# Patient Record
Sex: MALE | Race: OTHER | HISPANIC OR LATINO
[De-identification: names, ages, dates, MRNs, and addresses within clinical notes are randomized per-mention and may not be internally consistent; named-entity substitution may affect disease eponyms.]

---

## 2019-03-15 ENCOUNTER — APPOINTMENT (OUTPATIENT)
Dept: CARDIOLOGY | Facility: CLINIC | Age: 79
End: 2019-03-15
Payer: MEDICARE

## 2019-03-15 VITALS
BODY MASS INDEX: 30.58 KG/M2 | SYSTOLIC BLOOD PRESSURE: 150 MMHG | WEIGHT: 162 LBS | HEART RATE: 60 BPM | DIASTOLIC BLOOD PRESSURE: 60 MMHG | HEIGHT: 61 IN

## 2019-03-15 DIAGNOSIS — Z86.79 PERSONAL HISTORY OF OTHER DISEASES OF THE CIRCULATORY SYSTEM: ICD-10-CM

## 2019-03-15 DIAGNOSIS — R73.03 PREDIABETES.: ICD-10-CM

## 2019-03-15 PROCEDURE — 99204 OFFICE O/P NEW MOD 45 MIN: CPT

## 2019-03-15 NOTE — PHYSICAL EXAM
[General Appearance - Well Developed] : well developed [Normal Appearance] : normal appearance [Well Groomed] : well groomed [General Appearance - Well Nourished] : well nourished [No Deformities] : no deformities [General Appearance - In No Acute Distress] : no acute distress [Normal Conjunctiva] : the conjunctiva exhibited no abnormalities [Eyelids - No Xanthelasma] : the eyelids demonstrated no xanthelasmas [Normal Oral Mucosa] : normal oral mucosa [No Oral Pallor] : no oral pallor [No Oral Cyanosis] : no oral cyanosis [Normal Jugular Venous A Waves Present] : normal jugular venous A waves present [Normal Jugular Venous V Waves Present] : normal jugular venous V waves present [No Jugular Venous Hurley A Waves] : no jugular venous hurley A waves [Heart Rate And Rhythm] : heart rate and rhythm were normal [Heart Sounds] : normal S1 and S2 [Murmurs] : no murmurs present [Systolic grade ___/6] : A grade [unfilled]/6 systolic murmur was heard. [Respiration, Rhythm And Depth] : normal respiratory rhythm and effort [Exaggerated Use Of Accessory Muscles For Inspiration] : no accessory muscle use [Auscultation Breath Sounds / Voice Sounds] : lungs were clear to auscultation bilaterally [Abdomen Soft] : soft [Abdomen Tenderness] : non-tender [Abdomen Mass (___ Cm)] : no abdominal mass palpated [Abnormal Walk] : normal gait [Gait - Sufficient For Exercise Testing] : the gait was sufficient for exercise testing [Nail Clubbing] : no clubbing of the fingernails [Cyanosis, Localized] : no localized cyanosis [Petechial Hemorrhages (___cm)] : no petechial hemorrhages [Skin Color & Pigmentation] : normal skin color and pigmentation [] : no rash [No Venous Stasis] : no venous stasis [Skin Lesions] : no skin lesions [No Skin Ulcers] : no skin ulcer [No Xanthoma] : no  xanthoma was observed [Oriented To Time, Place, And Person] : oriented to person, place, and time [Affect] : the affect was normal [Mood] : the mood was normal [No Anxiety] : not feeling anxious

## 2019-04-01 ENCOUNTER — RESULT REVIEW (OUTPATIENT)
Age: 79
End: 2019-04-01

## 2019-04-23 ENCOUNTER — APPOINTMENT (OUTPATIENT)
Dept: CARDIOLOGY | Facility: CLINIC | Age: 79
End: 2019-04-23
Payer: MEDICARE

## 2019-04-23 VITALS
BODY MASS INDEX: 30.58 KG/M2 | HEART RATE: 73 BPM | OXYGEN SATURATION: 98 % | WEIGHT: 162 LBS | SYSTOLIC BLOOD PRESSURE: 150 MMHG | HEIGHT: 61 IN | DIASTOLIC BLOOD PRESSURE: 80 MMHG

## 2019-04-23 PROCEDURE — 99214 OFFICE O/P EST MOD 30 MIN: CPT

## 2019-04-23 NOTE — PHYSICAL EXAM
[General Appearance - Well Developed] : well developed [Well Groomed] : well groomed [Normal Appearance] : normal appearance [No Deformities] : no deformities [General Appearance - In No Acute Distress] : no acute distress [General Appearance - Well Nourished] : well nourished [Eyelids - No Xanthelasma] : the eyelids demonstrated no xanthelasmas [Normal Conjunctiva] : the conjunctiva exhibited no abnormalities [Normal Oral Mucosa] : normal oral mucosa [No Oral Pallor] : no oral pallor [No Oral Cyanosis] : no oral cyanosis [Normal Jugular Venous A Waves Present] : normal jugular venous A waves present [No Jugular Venous Hurley A Waves] : no jugular venous hurley A waves [Normal Jugular Venous V Waves Present] : normal jugular venous V waves present [Respiration, Rhythm And Depth] : normal respiratory rhythm and effort [Exaggerated Use Of Accessory Muscles For Inspiration] : no accessory muscle use [Auscultation Breath Sounds / Voice Sounds] : lungs were clear to auscultation bilaterally [Murmurs] : no murmurs present [Heart Rate And Rhythm] : heart rate and rhythm were normal [Heart Sounds] : normal S1 and S2 [Systolic grade ___/6] : A grade [unfilled]/6 systolic murmur was heard. [Abdomen Soft] : soft [Abdomen Tenderness] : non-tender [Gait - Sufficient For Exercise Testing] : the gait was sufficient for exercise testing [Abnormal Walk] : normal gait [Abdomen Mass (___ Cm)] : no abdominal mass palpated [Nail Clubbing] : no clubbing of the fingernails [Cyanosis, Localized] : no localized cyanosis [Petechial Hemorrhages (___cm)] : no petechial hemorrhages [Skin Color & Pigmentation] : normal skin color and pigmentation [No Venous Stasis] : no venous stasis [Skin Lesions] : no skin lesions [] : no rash [No Xanthoma] : no  xanthoma was observed [No Skin Ulcers] : no skin ulcer [Oriented To Time, Place, And Person] : oriented to person, place, and time [Affect] : the affect was normal [No Anxiety] : not feeling anxious [Mood] : the mood was normal

## 2019-04-23 NOTE — HISTORY OF PRESENT ILLNESS
[FreeTextEntry1] : Referred by PCP: Dr. Smith\par Renal: Dr. Pedro Clark\par \par 78-year-old gentleman with hyperlipidemia, prediabetes, ex-smoker, history of heart murmur who presents with episodes of presyncope, palpitations. His other symptoms include left-sided pressure radiating to his head. He is also fatigued which is not his normal state of health. He also complains of sweating on exertion. 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.\par Bilateral carotid ultrasound January 2016 reveals heterogeneous, smooth atherosclerotic plaque noted in the right internal carotid\par 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 Patient has a brother who had a stroke and a sister who had heart surgery.\par \par pt is here with daughter to discuss further options. Patient states that he no longer has chest pain daughter states that she has her father back and does not have the same symptoms that he originally presented with. Patient has a positive stress test however her daughter would like to defer further invasive management at this time\par

## 2019-04-23 NOTE — ASSESSMENT
[FreeTextEntry1] : EKG \par 2016Normal sinus rhythm\par March 15, 2019 sinus rhythm\par \par Patient with significant cardiac risk factors with chest pain and shortness of breath and diaphoresis on exertion\par Will recommend carotid ultrasound\par \par Nuclear stress test April 1, 2019 reversible inferolateral defect.\par ABIs March 28, 2019 normal\par Carotid ultrasound March 28, 2019 trace bilateral carotid disease less than 60% bilaterally\par March 28, 2019 normal EF 68%, grade 1/4 diastolic dysfunction, compared to study in January 2016 pulmonary pressures have improved\par \par Discussed risks and benefits of cardiac catheterization.\par Risks include but not limited to bleeding, infection, vascular compromise, stroke, heart attack, kidney failure, death.\par Benefits include revascularization and resolution of symptoms.\par Patient is in NOT in agreement with procedure. Will set up at [facility].\par \par Patient's daughter does not want patient to have any further cardiac tests at this time or invasive measures she will contact us regarding cardiac angiogram\par \par Patient also with tinnitus will change aspirin to Plavix\par Patient followup in 6 weeks

## 2019-06-04 ENCOUNTER — NON-APPOINTMENT (OUTPATIENT)
Age: 79
End: 2019-06-04

## 2019-06-04 ENCOUNTER — APPOINTMENT (OUTPATIENT)
Dept: CARDIOLOGY | Facility: CLINIC | Age: 79
End: 2019-06-04
Payer: MEDICARE

## 2019-06-04 VITALS
BODY MASS INDEX: 30.78 KG/M2 | HEIGHT: 61 IN | HEART RATE: 65 BPM | DIASTOLIC BLOOD PRESSURE: 60 MMHG | WEIGHT: 163 LBS | SYSTOLIC BLOOD PRESSURE: 150 MMHG

## 2019-06-04 PROCEDURE — 99214 OFFICE O/P EST MOD 30 MIN: CPT

## 2019-06-04 NOTE — PHYSICAL EXAM
[General Appearance - Well Developed] : well developed [Normal Appearance] : normal appearance [Well Groomed] : well groomed [No Deformities] : no deformities [General Appearance - Well Nourished] : well nourished [General Appearance - In No Acute Distress] : no acute distress [Normal Conjunctiva] : the conjunctiva exhibited no abnormalities [Normal Oral Mucosa] : normal oral mucosa [No Oral Pallor] : no oral pallor [Eyelids - No Xanthelasma] : the eyelids demonstrated no xanthelasmas [Normal Jugular Venous A Waves Present] : normal jugular venous A waves present [Normal Jugular Venous V Waves Present] : normal jugular venous V waves present [No Oral Cyanosis] : no oral cyanosis [No Jugular Venous Hurley A Waves] : no jugular venous hurley A waves [Respiration, Rhythm And Depth] : normal respiratory rhythm and effort [Exaggerated Use Of Accessory Muscles For Inspiration] : no accessory muscle use [Auscultation Breath Sounds / Voice Sounds] : lungs were clear to auscultation bilaterally [Heart Sounds] : normal S1 and S2 [Heart Rate And Rhythm] : heart rate and rhythm were normal [Murmurs] : no murmurs present [Systolic grade ___/6] : A grade [unfilled]/6 systolic murmur was heard. [Abdomen Soft] : soft [Abdomen Tenderness] : non-tender [Gait - Sufficient For Exercise Testing] : the gait was sufficient for exercise testing [Abdomen Mass (___ Cm)] : no abdominal mass palpated [Abnormal Walk] : normal gait [Nail Clubbing] : no clubbing of the fingernails [Cyanosis, Localized] : no localized cyanosis [Petechial Hemorrhages (___cm)] : no petechial hemorrhages [] : no rash [Skin Color & Pigmentation] : normal skin color and pigmentation [Skin Lesions] : no skin lesions [No Venous Stasis] : no venous stasis [No Skin Ulcers] : no skin ulcer [No Xanthoma] : no  xanthoma was observed [Oriented To Time, Place, And Person] : oriented to person, place, and time [No Anxiety] : not feeling anxious [Mood] : the mood was normal [Affect] : the affect was normal

## 2019-06-13 NOTE — HISTORY OF PRESENT ILLNESS
[FreeTextEntry1] : Referred by PCP: Dr. Smith\par Renal: Dr. Pedro Clark\par \par 78-year-old gentleman with hyperlipidemia, prediabetes, ex-smoker, history of heart murmur who presents with episodes of presyncope, palpitations. His other symptoms include left-sided pressure radiating to his head. He is also fatigued which is not his normal state of health. He also complains of sweating on exertion. 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.\par Bilateral carotid ultrasound January 2016 reveals heterogeneous, smooth atherosclerotic plaque noted in the right internal carotid\par 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 Patient has a brother who had a stroke and a sister who had heart surgery.\par \par Since last visit - pt has intermittent episodes of chest discomfort.  he may be travelling. \par

## 2019-06-13 NOTE — ASSESSMENT
[FreeTextEntry1] : EKG \par 2016 Normal sinus rhythm\par March 15, 2019 sinus rhythm\par \par Patient with significant cardiac risk factors with chest pain and shortness of breath and diaphoresis on exertion\par Will recommend carotid ultrasound\par \par Nuclear stress test April 1, 2019 reversible inferolateral defect.\par ABIs March 28, 2019 normal\par Carotid ultrasound March 28, 2019 trace bilateral carotid disease less than 60% bilaterally\par March 28, 2019 normal EF 68%, grade 1/4 diastolic dysfunction, compared to study in January 2016 pulmonary pressures have improved\par \par Discussed risks and benefits of cardiac catheterization.\par Risks include but not limited to bleeding, infection, vascular compromise, stroke, heart attack, kidney failure, death.\par Benefits include revascularization and resolution of symptoms.\par Patient is in NOT in agreement with procedure. Will set up at [facility].\par \par Patient's daughter does not want patient to have any further cardiac tests at this time or invasive measures she will contact us regarding cardiac angiogram\par \par Patient also with tinnitus will change aspirin to Plavix\par Patient followup in 6 weeks\par \par f/u 4 weeks for bp - renew plavix and rx nitro prn.

## 2019-06-27 ENCOUNTER — APPOINTMENT (OUTPATIENT)
Dept: CARDIOLOGY | Facility: CLINIC | Age: 79
End: 2019-06-27
Payer: MEDICARE

## 2019-06-27 ENCOUNTER — NON-APPOINTMENT (OUTPATIENT)
Age: 79
End: 2019-06-27

## 2019-06-27 VITALS
OXYGEN SATURATION: 98 % | RESPIRATION RATE: 16 BRPM | DIASTOLIC BLOOD PRESSURE: 70 MMHG | SYSTOLIC BLOOD PRESSURE: 160 MMHG | HEIGHT: 61 IN | HEART RATE: 59 BPM

## 2019-06-27 DIAGNOSIS — Z00.00 ENCOUNTER FOR GENERAL ADULT MEDICAL EXAMINATION W/OUT ABNORMAL FINDINGS: ICD-10-CM

## 2019-06-27 PROCEDURE — 93000 ELECTROCARDIOGRAM COMPLETE: CPT

## 2019-06-27 PROCEDURE — 99214 OFFICE O/P EST MOD 30 MIN: CPT

## 2019-06-27 RX ORDER — ASPIRIN 81 MG
81 TABLET, DELAYED RELEASE (ENTERIC COATED) ORAL DAILY
Refills: 0 | Status: DISCONTINUED | COMMUNITY
End: 2019-06-27

## 2019-06-27 NOTE — PHYSICAL EXAM
[General Appearance - Well Developed] : well developed [Normal Appearance] : normal appearance [Well Groomed] : well groomed [No Deformities] : no deformities [General Appearance - Well Nourished] : well nourished [General Appearance - In No Acute Distress] : no acute distress [Normal Conjunctiva] : the conjunctiva exhibited no abnormalities [Eyelids - No Xanthelasma] : the eyelids demonstrated no xanthelasmas [Normal Oral Mucosa] : normal oral mucosa [No Oral Pallor] : no oral pallor [No Oral Cyanosis] : no oral cyanosis [Normal Jugular Venous V Waves Present] : normal jugular venous V waves present [Normal Jugular Venous A Waves Present] : normal jugular venous A waves present [Respiration, Rhythm And Depth] : normal respiratory rhythm and effort [No Jugular Venous Hurley A Waves] : no jugular venous hurley A waves [Exaggerated Use Of Accessory Muscles For Inspiration] : no accessory muscle use [Auscultation Breath Sounds / Voice Sounds] : lungs were clear to auscultation bilaterally [Heart Rate And Rhythm] : heart rate and rhythm were normal [Heart Sounds] : normal S1 and S2 [Murmurs] : no murmurs present [Systolic grade ___/6] : A grade [unfilled]/6 systolic murmur was heard. [Abdomen Soft] : soft [Abdomen Tenderness] : non-tender [Abdomen Mass (___ Cm)] : no abdominal mass palpated [Abnormal Walk] : normal gait [Gait - Sufficient For Exercise Testing] : the gait was sufficient for exercise testing [Nail Clubbing] : no clubbing of the fingernails [Cyanosis, Localized] : no localized cyanosis [Petechial Hemorrhages (___cm)] : no petechial hemorrhages [Skin Color & Pigmentation] : normal skin color and pigmentation [] : no rash [No Venous Stasis] : no venous stasis [No Skin Ulcers] : no skin ulcer [Skin Lesions] : no skin lesions [No Xanthoma] : no  xanthoma was observed [Oriented To Time, Place, And Person] : oriented to person, place, and time [Affect] : the affect was normal [Mood] : the mood was normal [No Anxiety] : not feeling anxious

## 2019-06-27 NOTE — ASSESSMENT
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 \par \par Patient with significant cardiac risk factors with chest pain and shortness of breath and diaphoresis on exertion\par Will recommend carotid ultrasound\par \par Nuclear stress test April 1, 2019 reversible inferolateral defect.\par ABIs March 28, 2019 normal\par Carotid ultrasound March 28, 2019 trace bilateral carotid disease less than 60% bilaterally\par March 28, 2019 normal EF 68%, grade 1/4 diastolic dysfunction, compared to study in January 2016 pulmonary pressures have improved\par \par Discussed risks and benefits of cardiac catheterization.\par Risks include but not limited to bleeding, infection, vascular compromise, stroke, heart attack, kidney failure, death.\par Benefits include revascularization and resolution of symptoms.\par Patient is in NOT in agreement with procedure. Will set up at [facility].\par \par Patient's daughter does not want patient to have any further cardiac tests at this time or invasive measures she will contact us regarding cardiac angiogram\par \par Patient also with tinnitus will change aspirin to Plavix - unchanged.  \par Patient followup in 6 weeks\par \par follow up in 3 months or earlier.

## 2019-06-27 NOTE — HISTORY OF PRESENT ILLNESS
[FreeTextEntry1] : Referred by PCP: Dr. Smith\par Renal: Dr. Pedro Clark\par \par 78-year-old gentleman with hyperlipidemia, prediabetes, ex-smoker, history of heart murmur who presents with episodes of presyncope, palpitations. His other symptoms include left-sided pressure radiating to his head. He is also fatigued which is not his normal state of health. He also complains of sweating on exertion. 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.\par Bilateral carotid ultrasound 2016 reveals heterogeneous, smooth atherosclerotic plaque noted in the right internal carotid\par 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 Patient has a brother who had a stroke and a sister who had heart surgery.\par \par Since last visit - no chest pain since last admission. \par

## 2019-09-24 ENCOUNTER — NON-APPOINTMENT (OUTPATIENT)
Age: 79
End: 2019-09-24

## 2019-09-24 ENCOUNTER — APPOINTMENT (OUTPATIENT)
Dept: CARDIOLOGY | Facility: CLINIC | Age: 79
End: 2019-09-24
Payer: MEDICARE

## 2019-09-24 VITALS
DIASTOLIC BLOOD PRESSURE: 60 MMHG | SYSTOLIC BLOOD PRESSURE: 140 MMHG | HEART RATE: 62 BPM | HEIGHT: 61 IN | BODY MASS INDEX: 30.78 KG/M2 | WEIGHT: 163 LBS

## 2019-09-24 PROCEDURE — 93000 ELECTROCARDIOGRAM COMPLETE: CPT

## 2019-09-24 PROCEDURE — 99214 OFFICE O/P EST MOD 30 MIN: CPT

## 2019-09-24 RX ORDER — CLOPIDOGREL BISULFATE 75 MG/1
75 TABLET, FILM COATED ORAL
Refills: 0 | Status: DISCONTINUED | COMMUNITY
End: 2019-09-24

## 2019-09-24 RX ORDER — SPIRONOLACTONE 50 MG/1
50 TABLET ORAL DAILY
Refills: 0 | Status: DISCONTINUED | COMMUNITY
End: 2019-09-24

## 2019-09-24 NOTE — ASSESSMENT
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 \par \par Patient with significant cardiac risk factors with chest pain and shortness of breath and diaphoresis on exertion\par Will recommend carotid ultrasound\par \par Nuclear stress test April 1, 2019 reversible inferolateral defect.\par ABIs March 28, 2019 normal\par Carotid ultrasound March 28, 2019 trace bilateral carotid disease less than 60% bilaterally\par March 28, 2019 normal EF 68%, grade 1/4 diastolic dysfunction, compared to study in January 2016 pulmonary pressures have improved\par \par Discussed risks and benefits of cardiac catheterization.\par Risks include but not limited to bleeding, infection, vascular compromise, stroke, heart attack, kidney failure, death.\par Benefits include revascularization and resolution of symptoms.\par Patient is in NOT in agreement with procedure. Will set up at [facility].\par \par Patient's daughter does not want patient to have any further cardiac tests at this time or invasive measures she will contact us regarding cardiac angiogram\par \par Patient also with tinnitus will change aspirin to Plavix - unchanged.  \par \par follow up in 6 months or earlier.

## 2019-09-24 NOTE — PHYSICAL EXAM
[General Appearance - Well Developed] : well developed [Well Groomed] : well groomed [Normal Appearance] : normal appearance [No Deformities] : no deformities [General Appearance - Well Nourished] : well nourished [Normal Conjunctiva] : the conjunctiva exhibited no abnormalities [General Appearance - In No Acute Distress] : no acute distress [Eyelids - No Xanthelasma] : the eyelids demonstrated no xanthelasmas [Normal Oral Mucosa] : normal oral mucosa [No Oral Pallor] : no oral pallor [No Oral Cyanosis] : no oral cyanosis [Normal Jugular Venous A Waves Present] : normal jugular venous A waves present [Normal Jugular Venous V Waves Present] : normal jugular venous V waves present [No Jugular Venous Hurley A Waves] : no jugular venous hurley A waves [Respiration, Rhythm And Depth] : normal respiratory rhythm and effort [Exaggerated Use Of Accessory Muscles For Inspiration] : no accessory muscle use [Auscultation Breath Sounds / Voice Sounds] : lungs were clear to auscultation bilaterally [Heart Rate And Rhythm] : heart rate and rhythm were normal [Heart Sounds] : normal S1 and S2 [Murmurs] : no murmurs present [Systolic grade ___/6] : A grade [unfilled]/6 systolic murmur was heard. [Abdomen Soft] : soft [Abdomen Tenderness] : non-tender [Abdomen Mass (___ Cm)] : no abdominal mass palpated [Abnormal Walk] : normal gait [Gait - Sufficient For Exercise Testing] : the gait was sufficient for exercise testing [Nail Clubbing] : no clubbing of the fingernails [Cyanosis, Localized] : no localized cyanosis [Petechial Hemorrhages (___cm)] : no petechial hemorrhages [Skin Color & Pigmentation] : normal skin color and pigmentation [] : no rash [No Venous Stasis] : no venous stasis [Skin Lesions] : no skin lesions [No Skin Ulcers] : no skin ulcer [No Xanthoma] : no  xanthoma was observed [Oriented To Time, Place, And Person] : oriented to person, place, and time [Mood] : the mood was normal [Affect] : the affect was normal [No Anxiety] : not feeling anxious

## 2019-09-24 NOTE — HISTORY OF PRESENT ILLNESS
[FreeTextEntry1] : Referred by PCP: Dr. Smith\par Renal: Dr. Pedro Clark\par \par 78-year-old gentleman with hyperlipidemia, prediabetes, ex-smoker, history of heart murmur who presents with episodes of presyncope, palpitations. His other symptoms include left-sided pressure radiating to his head. He is also fatigued which is not his normal state of health. He also complains of sweating on exertion. 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.\par Bilateral carotid ultrasound 2016 reveals heterogeneous, smooth atherosclerotic plaque noted in the right internal carotid\par 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 Patient has a brother who had a stroke and a sister who had heart surgery.\par \par Since last visit - no chest pain\par

## 2020-03-24 ENCOUNTER — APPOINTMENT (OUTPATIENT)
Dept: CARDIOLOGY | Facility: CLINIC | Age: 80
End: 2020-03-24

## 2020-08-28 ENCOUNTER — NON-APPOINTMENT (OUTPATIENT)
Age: 80
End: 2020-08-28

## 2020-08-28 ENCOUNTER — APPOINTMENT (OUTPATIENT)
Dept: CARDIOLOGY | Facility: CLINIC | Age: 80
End: 2020-08-28
Payer: MEDICARE

## 2020-08-28 VITALS
HEIGHT: 61 IN | DIASTOLIC BLOOD PRESSURE: 60 MMHG | OXYGEN SATURATION: 97 % | SYSTOLIC BLOOD PRESSURE: 122 MMHG | HEART RATE: 62 BPM

## 2020-08-28 PROCEDURE — 93000 ELECTROCARDIOGRAM COMPLETE: CPT

## 2020-08-28 PROCEDURE — 99214 OFFICE O/P EST MOD 30 MIN: CPT

## 2020-08-28 RX ORDER — NITROGLYCERIN 0.4 MG/1
0.4 TABLET SUBLINGUAL
Qty: 15 | Refills: 0 | Status: DISCONTINUED | COMMUNITY
Start: 2019-06-04 | End: 2020-08-28

## 2020-08-28 NOTE — PHYSICAL EXAM
[General Appearance - Well Developed] : well developed [Normal Appearance] : normal appearance [Well Groomed] : well groomed [General Appearance - Well Nourished] : well nourished [General Appearance - In No Acute Distress] : no acute distress [No Deformities] : no deformities [Eyelids - No Xanthelasma] : the eyelids demonstrated no xanthelasmas [Normal Conjunctiva] : the conjunctiva exhibited no abnormalities [No Oral Cyanosis] : no oral cyanosis [Normal Oral Mucosa] : normal oral mucosa [No Oral Pallor] : no oral pallor [Normal Jugular Venous V Waves Present] : normal jugular venous V waves present [Normal Jugular Venous A Waves Present] : normal jugular venous A waves present [No Jugular Venous Hurley A Waves] : no jugular venous hurley A waves [Exaggerated Use Of Accessory Muscles For Inspiration] : no accessory muscle use [Respiration, Rhythm And Depth] : normal respiratory rhythm and effort [Auscultation Breath Sounds / Voice Sounds] : lungs were clear to auscultation bilaterally [Heart Sounds] : normal S1 and S2 [Heart Rate And Rhythm] : heart rate and rhythm were normal [Murmurs] : no murmurs present [Systolic grade ___/6] : A grade [unfilled]/6 systolic murmur was heard. [Abdomen Tenderness] : non-tender [Abdomen Soft] : soft [Abdomen Mass (___ Cm)] : no abdominal mass palpated [Gait - Sufficient For Exercise Testing] : the gait was sufficient for exercise testing [Abnormal Walk] : normal gait [Nail Clubbing] : no clubbing of the fingernails [Cyanosis, Localized] : no localized cyanosis [Skin Color & Pigmentation] : normal skin color and pigmentation [Petechial Hemorrhages (___cm)] : no petechial hemorrhages [No Venous Stasis] : no venous stasis [] : no rash [Skin Lesions] : no skin lesions [No Skin Ulcers] : no skin ulcer [Affect] : the affect was normal [No Xanthoma] : no  xanthoma was observed [Oriented To Time, Place, And Person] : oriented to person, place, and time [No Anxiety] : not feeling anxious [Mood] : the mood was normal

## 2020-08-28 NOTE — ASSESSMENT
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 \par Aug 2020 - NSR. \par \par Patient with significant cardiac risk factors with chest pain and shortness of breath and diaphoresis on exertion\par Will recommend carotid ultrasound\par \par Nuclear stress test April 1, 2019 reversible inferolateral defect.\par ABIs March 28, 2019 normal\par Carotid ultrasound March 28, 2019 trace bilateral carotid disease less than 60% bilaterally\par March 28, 2019 normal EF 68%, grade 1/4 diastolic dysfunction, compared to study in January 2016 pulmonary pressures have improved\par \par Discussed risks and benefits of cardiac catheterization.\par Risks include but not limited to bleeding, infection, vascular compromise, stroke, heart attack, kidney failure, death.\par Benefits include revascularization and resolution of symptoms.\par Patient is in NOT in agreement with procedure. Will set up at [facility].\par \par Patient's daughter does not want patient to have any further cardiac tests at this time or invasive measures she will contact us regarding cardiac angiogram\par \par Patient also with tinnitus will change aspirin to Plavix - unchanged.  \par \par Will recommend 2d echo and cardiac ct. Daughter would like to see nephrology first. \par \par follow up in 6 months or earlier.

## 2020-08-28 NOTE — HISTORY OF PRESENT ILLNESS
[FreeTextEntry1] : Referred by PCP: Dr. Smith\par Renal: Dr. Pedro Clark\par \par 79-year-old gentleman with hyperlipidemia, prediabetes, ex-smoker, history of heart murmur who presents with episodes of presyncope, palpitations. His other symptoms include left-sided pressure radiating to his head. He is also fatigued which is not his normal state of health. He also complains of sweating on exertion. 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.\par Bilateral carotid ultrasound 2016 reveals heterogeneous, smooth atherosclerotic plaque noted in the right internal carotid\par 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 Patient has a brother who had a stroke and a sister who had heart surgery.\par \par Since last visit -  had two episodes of chest pain where he felt pain radiated from chest to his head but is uncertain if this was tinnitus. \par EKG Aug 2020 - NSR. \par

## 2020-09-11 LAB
ALBUMIN SERPL ELPH-MCNC: 4.8 G/DL
ALP BLD-CCNC: 93 U/L
ALT SERPL-CCNC: 18 U/L
ANION GAP SERPL CALC-SCNC: 12 MMOL/L
AST SERPL-CCNC: 21 U/L
BASOPHILS # BLD AUTO: 0.05 K/UL
BASOPHILS NFR BLD AUTO: 0.8 %
BILIRUB SERPL-MCNC: 0.4 MG/DL
BUN SERPL-MCNC: 14 MG/DL
CALCIUM SERPL-MCNC: 9.8 MG/DL
CHLORIDE SERPL-SCNC: 99 MMOL/L
CHOLEST SERPL-MCNC: 136 MG/DL
CHOLEST/HDLC SERPL: 4.2 RATIO
CO2 SERPL-SCNC: 24 MMOL/L
CREAT SERPL-MCNC: 1.19 MG/DL
EOSINOPHIL # BLD AUTO: 0.16 K/UL
EOSINOPHIL NFR BLD AUTO: 2.6 %
GLUCOSE SERPL-MCNC: 131 MG/DL
HCT VFR BLD CALC: 41 %
HDLC SERPL-MCNC: 32 MG/DL
HGB BLD-MCNC: 13.1 G/DL
IMM GRANULOCYTES NFR BLD AUTO: 0.2 %
LDLC SERPL CALC-MCNC: 74 MG/DL
LYMPHOCYTES # BLD AUTO: 2.71 K/UL
LYMPHOCYTES NFR BLD AUTO: 44.2 %
MAN DIFF?: NORMAL
MCHC RBC-ENTMCNC: 29.5 PG
MCHC RBC-ENTMCNC: 32 GM/DL
MCV RBC AUTO: 92.3 FL
MONOCYTES # BLD AUTO: 0.68 K/UL
MONOCYTES NFR BLD AUTO: 11.1 %
NEUTROPHILS # BLD AUTO: 2.52 K/UL
NEUTROPHILS NFR BLD AUTO: 41.1 %
PLATELET # BLD AUTO: 260 K/UL
POTASSIUM SERPL-SCNC: 4.6 MMOL/L
PROT SERPL-MCNC: 7.8 G/DL
RBC # BLD: 4.44 M/UL
RBC # FLD: 13.8 %
SODIUM SERPL-SCNC: 135 MMOL/L
TRIGL SERPL-MCNC: 149 MG/DL
WBC # FLD AUTO: 6.13 K/UL

## 2020-09-28 ENCOUNTER — RESULT REVIEW (OUTPATIENT)
Age: 80
End: 2020-09-28

## 2020-10-22 ENCOUNTER — RESULT REVIEW (OUTPATIENT)
Age: 80
End: 2020-10-22

## 2021-02-26 ENCOUNTER — APPOINTMENT (OUTPATIENT)
Dept: CARDIOLOGY | Facility: CLINIC | Age: 81
End: 2021-02-26

## 2022-11-14 ENCOUNTER — NON-APPOINTMENT (OUTPATIENT)
Age: 82
End: 2022-11-14

## 2022-11-14 ENCOUNTER — APPOINTMENT (OUTPATIENT)
Dept: CARDIOLOGY | Facility: CLINIC | Age: 82
End: 2022-11-14

## 2022-11-14 VITALS
RESPIRATION RATE: 12 BRPM | DIASTOLIC BLOOD PRESSURE: 62 MMHG | HEIGHT: 61 IN | BODY MASS INDEX: 30.02 KG/M2 | OXYGEN SATURATION: 91 % | SYSTOLIC BLOOD PRESSURE: 160 MMHG | WEIGHT: 159 LBS | HEART RATE: 61 BPM

## 2022-11-14 DIAGNOSIS — N28.1 CYST OF KIDNEY, ACQUIRED: ICD-10-CM

## 2022-11-14 DIAGNOSIS — Z87.898 PERSONAL HISTORY OF OTHER SPECIFIED CONDITIONS: ICD-10-CM

## 2022-11-14 DIAGNOSIS — Z86.79 PERSONAL HISTORY OF OTHER DISEASES OF THE CIRCULATORY SYSTEM: ICD-10-CM

## 2022-11-14 DIAGNOSIS — R94.39 ABNORMAL RESULT OF OTHER CARDIOVASCULAR FUNCTION STUDY: ICD-10-CM

## 2022-11-14 DIAGNOSIS — K76.89 OTHER SPECIFIED DISEASES OF LIVER: ICD-10-CM

## 2022-11-14 DIAGNOSIS — R07.89 OTHER CHEST PAIN: ICD-10-CM

## 2022-11-14 DIAGNOSIS — R76.0 RAISED ANTIBODY TITER: ICD-10-CM

## 2022-11-14 DIAGNOSIS — Z86.69 PERSONAL HISTORY OF OTHER DISEASES OF THE NERVOUS SYSTEM AND SENSE ORGANS: ICD-10-CM

## 2022-11-14 DIAGNOSIS — Z87.438 PERSONAL HISTORY OF OTHER DISEASES OF MALE GENITAL ORGANS: ICD-10-CM

## 2022-11-14 DIAGNOSIS — Z82.49 FAMILY HISTORY OF ISCHEMIC HEART DISEASE AND OTHER DISEASES OF THE CIRCULATORY SYSTEM: ICD-10-CM

## 2022-11-14 PROCEDURE — 93000 ELECTROCARDIOGRAM COMPLETE: CPT

## 2022-11-14 PROCEDURE — 99214 OFFICE O/P EST MOD 30 MIN: CPT

## 2022-11-14 RX ORDER — ATORVASTATIN CALCIUM 40 MG/1
40 TABLET, FILM COATED ORAL DAILY
Refills: 0 | Status: ACTIVE | COMMUNITY

## 2022-11-14 RX ORDER — TAMSULOSIN HYDROCHLORIDE 0.4 MG/1
0.4 CAPSULE ORAL DAILY
Refills: 0 | Status: ACTIVE | COMMUNITY

## 2022-11-14 RX ORDER — CLOPIDOGREL BISULFATE 75 MG/1
75 TABLET, FILM COATED ORAL
Qty: 90 | Refills: 1 | Status: DISCONTINUED | COMMUNITY
Start: 2019-04-16 | End: 2022-11-14

## 2022-11-14 NOTE — REVIEW OF SYSTEMS
[Chest Discomfort] : chest discomfort [Negative] : Heme/Lymph [SOB] : no shortness of breath [Dyspnea on exertion] : not dyspnea during exertion [Lower Ext Edema] : no extremity edema [Leg Claudication] : no intermittent leg claudication [Palpitations] : no palpitations [Orthopnea] : no orthopnea [Syncope] : no syncope [Dizziness] : no dizziness [Tremor] : no tremor was seen [Numbness (Hypoesthesia)] : no numbness [Convulsions] : no convulsions [Weakness] : no weakness [Limb Weakness (Paresis)] : no limb weakness (Paresis) [Speech Disturbance] : no speech disturbance [de-identified] : T

## 2022-11-14 NOTE — PHYSICAL EXAM
[Well Developed] : well developed [Well Nourished] : well nourished [No Acute Distress] : no acute distress [Normal Conjunctiva] : normal conjunctiva [Normal Venous Pressure] : normal venous pressure [No Carotid Bruit] : no carotid bruit [Normal S1, S2] : normal S1, S2 [No Murmur] : no murmur [No Rub] : no rub [No Gallop] : no gallop [Clear Lung Fields] : clear lung fields [Good Air Entry] : good air entry [No Respiratory Distress] : no respiratory distress  [No Edema] : no edema [No Cyanosis] : no cyanosis [No Clubbing] : no clubbing [No Rash] : no rash [Moves all extremities] : moves all extremities [No Focal Deficits] : no focal deficits [Normal Speech] : normal speech [Alert and Oriented] : alert and oriented [Normal memory] : normal memory

## 2022-11-14 NOTE — CARDIOLOGY SUMMARY
[de-identified] : \par 11/14/22 ECG: Sinus bradycardia, rate 54 bpm\par  [de-identified] : \par 4/1/19 Lexiscan Myoview (at [place]): No chest pain. Mild inferolateral ischemia. Normal LV wall motion, LVEF 66%. \par  [de-identified] : \par 9/29/20 Echo (at [place]): Normal LV size and systolic function, LVEF 66%. Grade II DD. Mildly increased LA size. Mild MR/TR.\par  [de-identified] : \par 10/22/20 Cardiac CTA (at [place]): \par Total Agatston score = 0\par Trace calcium noted in LM coronary artery\par Aortic calcification

## 2022-11-14 NOTE — ASSESSMENT
[FreeTextEntry1] : 81 yo male with hypertension, hyperlipidemia, minimal CAD (trace calcium in LM per 10/2020 cardiac CTA), and intermittent nonexertional left sided chest discomfort/"tingling" which travels to his head. \par \par ECG today demonstrated sinus bradycardia.\par Low suspicion for ischemic heart disease as cause of his current atypical chest discomfort/"tingling" which spreads to his head. Patient was advised to check his BP the next time he should experience symptoms and call me with results.\par Given absence of exertional complaints and only trace calcium in LM per 10/2020 cardiac CTA, will defer further cardiac testing at this time.\par Will discontinue clopidogrel and patient to resume aspirin 81 mg po daily (stopped in past due to concern for possible cause of his tinnitus but no improvement with discontinuation of this med).\par \par BP is currently controlled.\par Will continue amlodipine 10 mg po daily, valsartan 40 mg po daily, and carvedilol 25 mg po bid.\par \par Will continue atorvastatin 40 mg po daily for hyperlipidemia management.

## 2022-11-14 NOTE — HISTORY OF PRESENT ILLNESS
[FreeTextEntry1] : 81 yo male with hypertension, hyperlipidemia, and minimal CAD (trace calcium in LM per 10/2020 cardiac CTA), who presents today for evaluation of intermittent nonexertional left sided chest discomfort/"tingling" which travels to his head. Patient denies exertional chest pain/dyspnea, palpitations, syncope, edema, melena, hematochezia, or hematemesis.\par

## 2023-07-07 NOTE — HISTORY OF PRESENT ILLNESS
[FreeTextEntry1] : Referred by PCP: Dr. Smith\par Renal: Dr. Pedro Clark\par \par 78-year-old gentleman with hyperlipidemia, prediabetes, ex-smoker, history of heart murmur who presents with episodes of presyncope, palpitations. His other symptoms include left-sided pressure radiating to his head. He is also fatigued which is not his normal state of health. He also complains of sweating on exertion. 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.\par Bilateral carotid ultrasound January 2016 reveals heterogeneous, smooth atherosclerotic plaque noted in the right internal carotid\par 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 Patient has a brother who had a stroke and a sister who had heart surgery. Ketoconazole Counseling:   Patient counseled regarding improving absorption with orange juice.  Adverse effects include but are not limited to breast enlargement, headache, diarrhea, nausea, upset stomach, liver function test abnormalities, taste disturbance, and stomach pain.  There is a rare possibility of liver failure that can occur when taking ketoconazole. The patient understands that monitoring of LFTs may be required, especially at baseline. The patient verbalized understanding of the proper use and possible adverse effects of ketoconazole.  All of the patient's questions and concerns were addressed.

## 2023-09-18 ENCOUNTER — APPOINTMENT (OUTPATIENT)
Dept: CARDIOLOGY | Facility: CLINIC | Age: 83
End: 2023-09-18
Payer: MEDICARE

## 2023-09-18 ENCOUNTER — NON-APPOINTMENT (OUTPATIENT)
Age: 83
End: 2023-09-18

## 2023-09-18 VITALS
SYSTOLIC BLOOD PRESSURE: 160 MMHG | RESPIRATION RATE: 14 BRPM | WEIGHT: 158 LBS | DIASTOLIC BLOOD PRESSURE: 82 MMHG | HEIGHT: 61 IN | OXYGEN SATURATION: 96 % | HEART RATE: 95 BPM | BODY MASS INDEX: 29.83 KG/M2

## 2023-09-18 DIAGNOSIS — G45.9 TRANSIENT CEREBRAL ISCHEMIC ATTACK, UNSPECIFIED: ICD-10-CM

## 2023-09-18 PROCEDURE — 93000 ELECTROCARDIOGRAM COMPLETE: CPT

## 2023-09-18 PROCEDURE — 99214 OFFICE O/P EST MOD 30 MIN: CPT

## 2023-09-18 RX ORDER — CARVEDILOL 12.5 MG/1
12.5 TABLET, FILM COATED ORAL
Qty: 180 | Refills: 3 | Status: ACTIVE | COMMUNITY
Start: 1900-01-01 | End: 1900-01-01

## 2023-09-18 RX ORDER — APIXABAN 5 MG/1
5 TABLET, FILM COATED ORAL
Qty: 180 | Refills: 3 | Status: ACTIVE | COMMUNITY
Start: 1900-01-01 | End: 1900-01-01

## 2024-01-17 ENCOUNTER — NON-APPOINTMENT (OUTPATIENT)
Age: 84
End: 2024-01-17

## 2024-01-17 ENCOUNTER — APPOINTMENT (OUTPATIENT)
Dept: CARDIOLOGY | Facility: CLINIC | Age: 84
End: 2024-01-17
Payer: MEDICARE

## 2024-01-17 VITALS
SYSTOLIC BLOOD PRESSURE: 151 MMHG | HEART RATE: 84 BPM | BODY MASS INDEX: 30.8 KG/M2 | RESPIRATION RATE: 14 BRPM | WEIGHT: 163 LBS | OXYGEN SATURATION: 97 % | DIASTOLIC BLOOD PRESSURE: 69 MMHG

## 2024-01-17 PROCEDURE — 99214 OFFICE O/P EST MOD 30 MIN: CPT

## 2024-01-17 PROCEDURE — 93000 ELECTROCARDIOGRAM COMPLETE: CPT

## 2024-01-17 RX ORDER — VALSARTAN 40 MG/1
40 TABLET, COATED ORAL DAILY
Refills: 0 | Status: DISCONTINUED | COMMUNITY
End: 2024-01-17

## 2024-01-17 RX ORDER — AMLODIPINE BESYLATE 5 MG/1
5 TABLET ORAL
Qty: 90 | Refills: 3 | Status: ACTIVE | COMMUNITY
Start: 2024-01-17 | End: 1900-01-01

## 2024-01-17 RX ORDER — VALSARTAN 80 MG/1
80 TABLET, COATED ORAL
Qty: 90 | Refills: 3 | Status: ACTIVE | COMMUNITY
Start: 2024-01-17 | End: 1900-01-01

## 2024-01-17 RX ORDER — AMLODIPINE BESYLATE 10 MG/1
10 TABLET ORAL DAILY
Refills: 0 | Status: DISCONTINUED | COMMUNITY
End: 2024-01-17

## 2024-01-17 NOTE — PHYSICAL EXAM
[Well Developed] : well developed [Well Nourished] : well nourished [No Acute Distress] : no acute distress [Normal Conjunctiva] : normal conjunctiva [Normal Venous Pressure] : normal venous pressure [No Carotid Bruit] : no carotid bruit [Normal Rate] : normal [Irregularly Irregular] : irregularly irregular [Normal S1] : normal S1 [Normal S2] : normal S2 [No Murmur] : no murmurs heard [No Pitting Edema] : no pitting edema present [Clear Lung Fields] : clear lung fields [Good Air Entry] : good air entry [No Respiratory Distress] : no respiratory distress  [No Edema] : no edema [No Cyanosis] : no cyanosis [No Clubbing] : no clubbing [Moves all extremities] : moves all extremities [No Focal Deficits] : no focal deficits [Normal Speech] : normal speech [S3] : no S3 [S4] : no S4 [Right Carotid Bruit] : no bruit heard over the right carotid [Left Carotid Bruit] : no bruit heard over the left carotid

## 2024-01-17 NOTE — CARDIOLOGY SUMMARY
[de-identified] : 1/17/24 ECG: Atrial fibrillation, rate 59 bpm, low voltage QRS, poor R-wave progression 9/14/23 ECG: Atrial fibrillation, rate 88 bpm, low voltage QRS, poor R wave progression 9/10/23 ECG (at [place]): Atrial fibrillation, rate 68 bpm, LAD, low voltage QRS 11/14/22 ECG: Sinus bradycardia, rate 54 bpm [de-identified] : \par  4/1/19 Lexiscan Myoview (at [place]): No chest pain. Mild inferolateral ischemia. Normal LV wall motion, LVEF 66%. \par   [de-identified] : 9/1/23 Echo (at [place]): Normal LV systolic function, LVEF 62%. Indeterminate diastolic function. No PFO per color Doppler or bubble study. Mod LAE. Mild BHARGAV. Mild AR. Mod MR. Mild to mod TR. PASP 29 mmHg. 9/29/20 Echo (at [place]): Normal LV size and systolic function, LVEF 66%. Grade II DD. Mildly increased LA size. Mild MR/TR. [de-identified] : 10/22/20 Cardiac CTA (at [place]): Total Agatston score = 0. Trace calcium noted in LM coronary artery. Aortic calcification.

## 2024-01-17 NOTE — ASSESSMENT
[FreeTextEntry1] : 84 yo male with hypertension, hyperlipidemia, minimal CAD (trace calcium in LM per 10/2020 cardiac CTA), and recent TIA and new atrial fibrillation.  ECG today demonstrated atrial fibrillation, rate 59 bpm, low voltage QRS, poor R wave progression. Will continue rate control with carvedilol 12.5 mg po bid. Will continue Eliquis 5 mg po bid.  BP is currently uncontrolled. Will continue amlodipine 5 mg po daily (patient intolerant of 10 mg dose) and carvedilol 12.5 mg po bid. Will increase valsartan to 80 mg po daily. Patient was instructed to monitor BP over the next week and keep a log of BP readings in AM, mid-day, and evening. Patient to call if BP readings are persistently elevated.  Will continue atorvastatin 40 mg po daily for hyperlipidemia management.

## 2024-01-17 NOTE — REVIEW OF SYSTEMS
[Chest Discomfort] : chest discomfort [Negative] : Heme/Lymph [SOB] : no shortness of breath [Dyspnea on exertion] : not dyspnea during exertion [Lower Ext Edema] : no extremity edema [Leg Claudication] : no intermittent leg claudication [Palpitations] : no palpitations [Orthopnea] : no orthopnea [Syncope] : no syncope [Dizziness] : no dizziness [Tremor] : no tremor was seen [Numbness (Hypoesthesia)] : no numbness [Convulsions] : no convulsions [Weakness] : no weakness [Limb Weakness (Paresis)] : no limb weakness (Paresis) [Speech Disturbance] : no speech disturbance

## 2024-01-17 NOTE — HISTORY OF PRESENT ILLNESS
[FreeTextEntry1] : 84 yo male with hypertension, hyperlipidemia, minimal CAD (trace calcium in LM per 10/2020 cardiac CTA), and TIA + atrial fibrillation diagnosed 8/2023, who presents today for follow-up. Patient reports that his BP has not been controlled recently with reported -170s. Patient denies chest pain, dyspnea, palpitations, syncope, edema, melena, hematochezia, or hematemesis. Patient reports that he reduced his amlodipine to 1/2 tab of 10 mg (5 mg) due to reportedly not feeling well at this dose.

## 2024-01-17 NOTE — REASON FOR VISIT
[Other: ____] : [unfilled] [Hyperlipidemia] : hyperlipidemia [Hypertension] : hypertension [Coronary Artery Disease] : coronary artery disease

## 2024-04-17 ENCOUNTER — APPOINTMENT (OUTPATIENT)
Dept: CARDIOLOGY | Facility: CLINIC | Age: 84
End: 2024-04-17

## 2024-05-08 ENCOUNTER — NON-APPOINTMENT (OUTPATIENT)
Age: 84
End: 2024-05-08

## 2024-05-08 ENCOUNTER — APPOINTMENT (OUTPATIENT)
Dept: CARDIOLOGY | Facility: CLINIC | Age: 84
End: 2024-05-08
Payer: MEDICARE

## 2024-05-08 VITALS
SYSTOLIC BLOOD PRESSURE: 164 MMHG | HEIGHT: 61 IN | BODY MASS INDEX: 30.58 KG/M2 | RESPIRATION RATE: 14 BRPM | OXYGEN SATURATION: 98 % | WEIGHT: 162 LBS | HEART RATE: 82 BPM | DIASTOLIC BLOOD PRESSURE: 76 MMHG

## 2024-05-08 DIAGNOSIS — I10 ESSENTIAL (PRIMARY) HYPERTENSION: ICD-10-CM

## 2024-05-08 DIAGNOSIS — I48.91 UNSPECIFIED ATRIAL FIBRILLATION: ICD-10-CM

## 2024-05-08 DIAGNOSIS — E78.5 HYPERLIPIDEMIA, UNSPECIFIED: ICD-10-CM

## 2024-05-08 DIAGNOSIS — I25.10 ATHEROSCLEROTIC HEART DISEASE OF NATIVE CORONARY ARTERY W/OUT ANGINA PECTORIS: ICD-10-CM

## 2024-05-08 PROCEDURE — 93000 ELECTROCARDIOGRAM COMPLETE: CPT

## 2024-05-08 PROCEDURE — 99214 OFFICE O/P EST MOD 30 MIN: CPT

## 2024-05-09 PROBLEM — I48.91 ATRIAL FIBRILLATION: Status: ACTIVE | Noted: 2023-09-18

## 2024-05-09 PROBLEM — I10 ESSENTIAL HYPERTENSION: Status: ACTIVE | Noted: 2019-03-15

## 2024-05-09 PROBLEM — I25.10 CAD (CORONARY ARTERY DISEASE): Status: ACTIVE | Noted: 2022-11-14

## 2024-05-09 PROBLEM — E78.5 HYPERLIPIDEMIA, UNSPECIFIED HYPERLIPIDEMIA TYPE: Status: ACTIVE | Noted: 2019-03-15

## 2024-05-09 NOTE — CARDIOLOGY SUMMARY
[de-identified] : 5/8/24 ECG: Atrial fibrillation, rate 75 bpm, low voltage QRS 1/17/24 ECG: Atrial fibrillation, rate 59 bpm, low voltage QRS, poor R-wave progression 9/14/23 ECG: Atrial fibrillation, rate 88 bpm, low voltage QRS, poor R wave progression 9/10/23 ECG (at [place]): Atrial fibrillation, rate 68 bpm, LAD, low voltage QRS [de-identified] : 4/1/19 Lexiscan Myoview (at [place]): No chest pain. Mild inferolateral ischemia. Normal LV wall motion, LVEF 66%.   [de-identified] : 9/1/23 Echo (at [place]): Normal LV systolic function, LVEF 62%. Indeterminate diastolic function. No PFO per color Doppler or bubble study. Mod LAE. Mild BHARGAV. Mild AR. Mod MR. Mild to mod TR. PASP 29 mmHg. 9/29/20 Echo (at [place]): Normal LV size and systolic function, LVEF 66%. Grade II DD. Mildly increased LA size. Mild MR/TR. [de-identified] : 10/22/20 Cardiac CTA (at [place]): Total Agatston score = 0. Trace calcium noted in LM coronary artery. Aortic calcification.

## 2024-05-09 NOTE — ASSESSMENT
[FreeTextEntry1] : 84 yo male with hypertension, hyperlipidemia, minimal CAD (trace calcium in LM per 10/2020 cardiac CTA), TIA, and atrial fibrillation.  ECG today demonstrated atrial fibrillation with low voltage QRS. Will continue rate control with carvedilol 12.5 mg po bid. Will continue Eliquis 5 mg po bid.  BP is currently controlled. Will continue amlodipine 5 mg po daily (patient intolerant of 10 mg dose), carvedilol 12.5 mg po bid, and valsartan 80 mg po daily.   Will continue atorvastatin 40 mg po daily for hyperlipidemia management. Will request most recent labs from PMD for review.

## 2024-05-09 NOTE — HISTORY OF PRESENT ILLNESS
[FreeTextEntry1] : 82 yo male with hypertension, hyperlipidemia, minimal CAD (trace calcium in LM per 10/2020 cardiac CTA), and TIA + atrial fibrillation diagnosed 8/2023, who presents today for follow-up. Patient reports that his BP has been controlled with reported -130s. Patient denies chest pain, dyspnea, palpitations, syncope, edema, melena, hematochezia, or hematemesis.